# Patient Record
Sex: FEMALE | URBAN - METROPOLITAN AREA
[De-identification: names, ages, dates, MRNs, and addresses within clinical notes are randomized per-mention and may not be internally consistent; named-entity substitution may affect disease eponyms.]

---

## 2018-04-17 ENCOUNTER — THERAPY VISIT (OUTPATIENT)
Dept: PHYSICAL THERAPY | Facility: CLINIC | Age: 58
End: 2018-04-17
Payer: COMMERCIAL

## 2018-04-17 DIAGNOSIS — R20.0 RIGHT ARM NUMBNESS: Primary | ICD-10-CM

## 2018-04-17 PROCEDURE — 97110 THERAPEUTIC EXERCISES: CPT | Mod: GP | Performed by: PHYSICAL THERAPIST

## 2018-04-17 PROCEDURE — 97162 PT EVAL MOD COMPLEX 30 MIN: CPT | Mod: GP | Performed by: PHYSICAL THERAPIST

## 2018-04-17 NOTE — MR AVS SNAPSHOT
"              After Visit Summary   4/17/2018    Evelia Skaggs    MRN: [id number]           Patient Information     Date Of Birth          1960        Visit Information        Provider Department      4/17/2018 3:20 PM Matthew Hameed PT [facility] [place] [facility]        Today's Diagnoses     Right arm numbness    -  1       Follow-ups after your visit        Your next 10 appointments already scheduled     Apr 27, 2018  3:20 PM CDT   SUSIE Extremity with Matthew Hameed PT   [facility] [place] [facility] [facility] ([facility] [place])    [address]  [address]  [place] [address] [phone]              Who to contact     If you have questions or need follow up information about today's clinic visit or your schedule please contact [place] [facility]AP[facility] directly at [phone].  Normal or non-critical lab and imaging results will be communicated to you by [employer], letter or phone within 4 business days after the clinic has received the results. If you do not hear from us within 7 days, please contact the clinic through [employer] or phone. If you have a critical or abnormal lab result, we will notify you by phone as soon as possible.  Submit refill requests through [employer] or call your pharmacy and they will forward the refill request to us. Please allow 3 business days for your refill to be completed.          Additional Information About Your Visit        [employer] Information     [employer] lets you send messages to your doctor, view your test results, renew your prescriptions, schedule appointments and more. To sign up, go to [email or web address] . Click on \"Log in\" on the left side of the screen, which will take you to the Welcome page. Then click on \"Sign up Now\" on the right side of the page.     You will be asked to enter the access code listed below, as well as some personal " information. Please follow the directions to create your username and password.     Your access code is: [email or web address]  Expires: 2018  5:54 PM     Your access code will  in 90 days. If you need help or a new code, please call your [place] clinic or [phone].        Care EveryWhere ID     This is your Care EveryWhere ID. This could be used by other organizations to access your [place] medical records  [id number]         Blood Pressure from Last 3 Encounters:   No data found for BP    Weight from Last 3 Encounters:   No data found for Wt              We Performed the Following     HC PT EVAL, MODERATE COMPLEXITY     SUSIE INITIAL EVAL REPORT     THERAPEUTIC EXERCISES        Primary Care Provider Fax #    Physician No Ref-Primary [phone]       No address on file        Equal Access to Services     LIDA JON : Hadii dalton juarez Soanitha, waaxda luqadaha, qaybta kaalmada adeegyada, sam lee . So [facility] [phone].    ATENCIÓN: Si habla español, tiene a limon disposición servicios gratuitos de asistencia lingüística. Llame al [phone].    We comply with applicable federal civil rights laws and [place] laws. We do not discriminate on the basis of race, color, national origin, age, disability, sex, sexual orientation, or gender identity.            Thank you!     Thank you for choosing [facility]  for your care. Our goal is always to provide you with excellent care. Hearing back from our patients is one way we can continue to improve our services. Please take a few minutes to complete the written survey that you may receive in the mail after your visit with us. Thank you!             Your Updated Medication List - Protect others around you: Learn how to safely use, store and throw away your medicines at www.disposemymeds.org.      Notice  As of 2018  5:54 PM    You have not been prescribed any medications.

## 2018-04-17 NOTE — PROGRESS NOTES
[place] [facility] Initial Evaluation  Subjective:  Patient is a 58 year old female presenting with rehab cervical spine hpi.   Evelia Skaggs is a 58 year old female with a cervical spine condition.  Condition occurred with:  Insidious onset.    This is a new condition  9 months ago.      Radiates to:  Hand right and lower arm right.   and is intermittent Pain Scale: No pain.  Associated symptoms:  Tingling, numbness and loss of strength.   Symptoms are exacerbated by certain positions and rotating head and relieved by activity/movement and rest.      Previous treatment includes physical therapy.  There was mild improvement following previous treatment.  General health as reported by patient is good.    Medical allergies: yes (penicillin, sulpha).  Other surgeries include:  No.  Current medications:  Other (supplements).  Current occupation is [employer]  Patient is working in normal job without restrictions.  Primary job tasks include:  Prolonged sitting.    Barriers include:  None as reported by the patient.    Red flags:  None as reported by the patient.                        Objective:  Standing Alignment:    Cervical/Thoracic:  Forward head and thoracic kyphosis increased  Shoulder/UE:  Rounded shoulders              Gait:    Gait Type:  Normal           Neurological: A sensory deficit is present.   (+) Spurlings Test, Increased symptoms with traction, Myotome C6/7 weakness,                  Cervical/Thoracic Evaluation    AROM:  AROM Cervical:    Flexion:            WNL  Extension:       WNL  Rotation:         Left: Pt reports stiffness     Right: Pt reports stiffness  Side Bend:      Left: Pt reports stiffness     Right:  Pt reports stiffness    Strength: Not assessed  Headaches: none  Cervical Myotomes:        C4 (shrug):  Right: 5  C5 (Deltoid):  Right: 5-  C6 (Biceps):  Right: 4  C7 (Triceps):  Right: 4        Neural Tension:      Right side:  Ulnar and Median  positive.  Right side:  Radial  negative.  Cervical Dermatomes:            C5 left:  Normal-light touch     C5 right:  Hypo-light touch    C6 left:  Normal-light touch     C6 right:  Hypo-light touch    C7 left:  Normal-light touch     C7 right:  Hypo-light touch            Cervical Stability/Joint Clearing:      Left negative at: 1st Rib or TOS Screen  Right positive at:  TOS Screen  Right negative at:  1st Rib           Shoulder Evaluation:  ROM:  AROM:  normal                                  Strength:    Flexion: Left:4-/5   Pain:    Right: 4-/5     Pain:     Abduction:  Left: 4-/5  Pain:    Right: 4-/5     Pain:    Internal Rotation:  Left:4+/5     Pain:    Right: 4+/5     Pain:  External Rotation:   Left:4-/5     Pain:   Right:4-/5     Pain:                       ROM:  AROM:  normal                          Strength:    Flexion Elbow:  Left: 5-/5 Pain:    Right: 5-/5 Pain:  Extension Elbow: Left: 4+/5 Pain:    Right: 4+/5 Pain:            :  Dominance: Left   Left: 42,38,39 Average 40      Right: 38,35,35 Average 36    Special Testing:    Left wrist/elbow negative for the following special tests:   Tinel's  Right wrist/elbow positive for the following special tests: Pronator TeresRight wrist/elbow negative for the following special tests: Tinel's; Lateral Epicondylitis; Medial Epicondylitis or Phalen's  Palpation:  normal                                    General     ROS    Assessment/Plan:    Patient is a 58 year old female with cervical, right side elbow and right side wrist/hand complaints.    Patient has the following significant findings with corresponding treatment plan.                Diagnosis 1:  Numbness/Tingling Right Hand/Forearm  Pain -  hot/cold therapy, US, mechanical traction, manual therapy, self management, education and home program  Decreased strength - therapeutic exercise, therapeutic activities and home program  Impaired muscle performance - electric stimulation, neuro  re-education and home program  Decreased function - therapeutic activities, home program and functional performance testing    Therapy Evaluation Codes:   1) History comprised of:   Personal factors that impact the plan of care:      None.    Comorbidity factors that impact the plan of care are:      None.     Medications impacting care: None.  2) Examination of Body Systems comprised of:   Body structures and functions that impact the plan of care:      Cervical spine, Elbow and Hand, and Forearm.   Activity limitations that impact the plan of care are:      Driving, Grasping, Lifting, Reading/Computer work and Working.  3) Clinical presentation characteristics are:   Stable/Uncomplicated.  4) Decision-Making    Moderate complexity using standardized patient assessment instrument and/or measureable assessment of functional outcome.  Cumulative Therapy Evaluation is: Low complexity.    Previous and current functional limitations:  (See Goal Flow Sheet for this information)    Short term and Long term goals: (See Goal Flow Sheet for this information)     Communication ability:  Patient appears to be able to clearly communicate and understand verbal and written communication and follow directions correctly.  Treatment Explanation - The following has been discussed with the patient:   RX ordered/plan of care  Anticipated outcomes  Possible risks and side effects  This patient would benefit from PT intervention to resume normal activities.   Rehab potential is good.    Frequency:  2 X a month, once daily  Duration:  for 3 months  Discharge Plan:  Achieve all LTG.  Independent in home treatment program.  Reach maximal therapeutic benefit.    Please refer to the daily flowsheet for treatment today, total treatment time and time spent performing 1:1 timed codes.

## 2018-04-17 NOTE — LETTER
[facility] PHYSICAL THERAPY  [address] [place] [address]  [address]  [facility] [address]  [phone]    2018    Re: Evelia Skaggs   :   1960  MRN:  [id number]   REFERRING PHYSICIAN:   Referred Self    [facility]    Date of Initial Evaluation:  ***  Visits:  Rxs Used: 1  Reason for Referral:  Right arm numbness    EVALUATION SUMMARY    [facility]  Subjective:  Patient is a 58 year old female presenting with rehab cervical spine hpi.   Evelia Skaggs is a 58 year old female with a cervical spine condition.  Condition occurred with:  Insidious onset.    This is a new condition  9 months ago.      Radiates to:  Hand right and lower arm right.   and is intermittent Pain Scale: No pain.  Associated symptoms:  Tingling, numbness and loss of strength.   Symptoms are exacerbated by certain positions and rotating head and relieved by activity/movement and rest.      Previous treatment includes physical therapy.  There was mild improvement following previous treatment.  General health as reported by patient is good.    Medical allergies: yes (penicillin, sulpha).  Other surgeries include:  No.  Current medications:  Other (supplements).  Current occupation is [employer]  Patient is working in normal job without restrictions.  Primary job tasks include:  Prolonged sitting.    Barriers include:  None as reported by the patient.    Red flags:  None as reported by the patient.                        Objective:  Standing Alignment:    Cervical/Thoracic:  Forward head and thoracic kyphosis increased  Shoulder/UE:  Rounded shoulders              Gait:    Gait Type:  Normal           Neurological: A sensory deficit is present.   (+) Spurlings Test, Increased symptoms with traction, Myotome C6/7 weakness,                  Cervical/Thoracic  Evaluation    AROM:  AROM Cervical:    Flexion:            WNL  Extension:       WNL  Rotation:         Left: Pt reports stiffness     Right: Pt reports stiffness  Side Bend:      Left: Pt reports stiffness     Right:  Pt reports stiffness    Strength: Not assessed  Headaches: none  Cervical Myotomes:        C4 (shrug):  Right: 5  C5 (Deltoid):  Right: 5-  C6 (Biceps):  Right: 4  C7 (Triceps):  Right: 4        Neural Tension:      Right side:  Ulnar and Median positive.  Right side:  Radial  negative.  Cervical Dermatomes:            C5 left:  Normal-light touch     C5 right:  Hypo-light touch    C6 left:  Normal-light touch     C6 right:  Hypo-light touch    C7 left:  Normal-light touch     C7 right:  Hypo-light touch            Cervical Stability/Joint Clearing:      Left negative at: 1st Rib or TOS Screen  Right positive at:  TOS Screen  Right negative at:  1st Rib           Shoulder Evaluation:  ROM:  AROM:  normal                                  Strength:    Flexion: Left:4-/5   Pain:    Right: 4-/5     Pain:     Abduction:  Left: 4-/5  Pain:    Right: 4-/5     Pain:    Internal Rotation:  Left:4+/5     Pain:    Right: 4+/5     Pain:  External Rotation:   Left:4-/5     Pain:   Right:4-/5     Pain:                       ROM:  AROM:  normal                          Strength:    Flexion Elbow:  Left: 5-/5 Pain:    Right: 5-/5 Pain:  Extension Elbow: Left: 4+/5 Pain:    Right: 4+/5 Pain:            :  Dominance: Left   Left: 42,38,39 Average 40      Right: 38,35,35 Average 36    Special Testing:    Left wrist/elbow negative for the following special tests:   Tinel's  Right wrist/elbow positive for the following special tests: Pronator TeresRight wrist/elbow negative for the following special tests: Tinel's; Lateral Epicondylitis; Medial Epicondylitis or Phalen's  Palpation:  normal                                    General     ROS    Assessment/Plan:    Patient is a 58 year old female with cervical,  right side elbow and right side wrist/hand complaints.    Patient has the following significant findings with corresponding treatment plan.                Diagnosis 1:  Numbness/Tingling Right Hand/Forearm  Pain -  hot/cold therapy, US, mechanical traction, manual therapy, self management, education and home program  Decreased strength - therapeutic exercise, therapeutic activities and home program  Impaired muscle performance - electric stimulation, neuro re-education and home program  Decreased function - therapeutic activities, home program and functional performance testing    Therapy Evaluation Codes:   1) History comprised of:   Personal factors that impact the plan of care:      None.    Comorbidity factors that impact the plan of care are:      None.     Medications impacting care: None.  2) Examination of Body Systems comprised of:   Body structures and functions that impact the plan of care:      Cervical spine, Elbow and Hand, and Forearm.   Activity limitations that impact the plan of care are:      Driving, Grasping, Lifting, Reading/Computer work and Working.  3) Clinical presentation characteristics are:   Stable/Uncomplicated.  4) Decision-Making    Moderate complexity using standardized patient assessment instrument and/or measureable assessment of functional outcome.  Cumulative Therapy Evaluation is: Low complexity.    Previous and current functional limitations:  (See Goal Flow Sheet for this information)    Short term and Long term goals: (See Goal Flow Sheet for this information)     Communication ability:  Patient appears to be able to clearly communicate and understand verbal and written communication and follow directions correctly.  Treatment Explanation - The following has been discussed with the patient:   RX ordered/plan of care  Anticipated outcomes  Possible risks and side effects  This patient would benefit from PT intervention to resume normal activities.   Rehab potential is  good.    Frequency:  2 X a month, once daily  Duration:  for 3 months  Discharge Plan:  Achieve all LTG.  Independent in home treatment program.  Reach maximal therapeutic benefit.    Please refer to the daily flowsheet for treatment today, total treatment time and time spent performing 1:1 timed codes.         [place] [facility] Initial Evaluation  Subjective:                           Past medical history: Numbness/tingling.  Medical allergies: yes (Penicillin, sulpha).    Medication history: Supplements- calcium/vit D, Vit D, Tumeric.  Current occupation is [employer].    Primary job tasks include:  Prolonged sitting (Lots of walking).                                Objective:  System    Physical Exam    General     ROS    Assessment/Plan:            Thank you for your referral.    INQUIRIES  Therapist: Isidra Bird   [place] [facility]  [address] [place] [address]  [address]  [facility] [address]  Phone: [phone]  Fax: [phone]

## 2018-04-20 NOTE — PROGRESS NOTES
[place] [facility] Initial Evaluation  Subjective:  Patient is a 58 year old female presenting with rehab left ankle/foot hpi.                                      Past medical history: Numbness/tingling.  Medical allergies: yes (Penicillin, sulpha).    Medication history: Supplements- calcium/vit D, Vit D, Tumeric.  Current occupation is [employer].    Primary job tasks include:  Prolonged sitting (Lots of walking).                                Objective:  System    Physical Exam    General     ROS    Assessment/Plan:

## 2018-04-27 ENCOUNTER — THERAPY VISIT (OUTPATIENT)
Dept: PHYSICAL THERAPY | Facility: CLINIC | Age: 58
End: 2018-04-27
Payer: COMMERCIAL

## 2018-04-27 DIAGNOSIS — R20.0 RIGHT ARM NUMBNESS: ICD-10-CM

## 2018-04-27 PROCEDURE — 97110 THERAPEUTIC EXERCISES: CPT | Mod: GP | Performed by: PHYSICAL THERAPIST

## 2018-04-27 PROCEDURE — 97112 NEUROMUSCULAR REEDUCATION: CPT | Mod: GP | Performed by: PHYSICAL THERAPIST
